# Patient Record
Sex: FEMALE | Race: WHITE | URBAN - METROPOLITAN AREA
[De-identification: names, ages, dates, MRNs, and addresses within clinical notes are randomized per-mention and may not be internally consistent; named-entity substitution may affect disease eponyms.]

---

## 2019-06-21 ENCOUNTER — OFFICE VISIT (OUTPATIENT)
Dept: URBAN - METROPOLITAN AREA CLINIC 33 | Facility: CLINIC | Age: 69
End: 2019-06-21
Payer: MEDICARE

## 2019-06-21 PROCEDURE — 99204 OFFICE O/P NEW MOD 45 MIN: CPT | Performed by: OPHTHALMOLOGY

## 2019-06-21 ASSESSMENT — INTRAOCULAR PRESSURE
OS: 10
OD: 10

## 2019-06-21 NOTE — IMPRESSION/PLAN
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 Discussed treatment options with patient. Recommend excision of left lower eyelid lesions medial and lateral w/ biopsy  to rule out malignancy of suspicious looking lesions.

## 2019-11-22 ENCOUNTER — PROCEDURE (OUTPATIENT)
Dept: URBAN - METROPOLITAN AREA CLINIC 33 | Facility: CLINIC | Age: 69
End: 2019-11-22
Payer: MEDICARE

## 2019-11-22 DIAGNOSIS — D48.1 NEOPLASM OF UNCERTAIN BEHAVIOR OF CONNECTIVE AND OTHER SOFT TISSUE: Primary | ICD-10-CM

## 2019-11-22 PROCEDURE — 67840 REMOVE EYELID LESION: CPT | Performed by: OPHTHALMOLOGY

## 2019-11-22 PROCEDURE — 99214 OFFICE O/P EST MOD 30 MIN: CPT | Performed by: OPHTHALMOLOGY

## 2019-11-22 RX ORDER — ERYTHROMYCIN 5 MG/G
OINTMENT OPHTHALMIC
Qty: 1 | Refills: 0 | Status: INACTIVE
Start: 2019-11-22 | End: 2020-12-17

## 2019-11-22 NOTE — IMPRESSION/PLAN
Impression: Neoplasm of uncertain behavior of connective and other soft tissue: D48.1. Plan: Discussed diagnosis in detail with patient. Discussed treatment options with patient. Recommend biopsy, obtain prior authorization, further conduct after biopsy results. RBA's discussed in detail with patient today, patient understands and agrees and wishes to proceed with procedure. Schedule excision of lesions in clinic today.

## 2020-12-17 ENCOUNTER — OFFICE VISIT (OUTPATIENT)
Dept: URBAN - METROPOLITAN AREA CLINIC 33 | Facility: CLINIC | Age: 70
End: 2020-12-17
Payer: MEDICARE

## 2020-12-17 PROCEDURE — 92004 COMPRE OPH EXAM NEW PT 1/>: CPT | Performed by: OPTOMETRIST

## 2020-12-17 ASSESSMENT — INTRAOCULAR PRESSURE
OS: 17
OD: 17

## 2020-12-17 ASSESSMENT — KERATOMETRY
OD: 43.13
OS: 43.38

## 2020-12-17 NOTE — IMPRESSION/PLAN
Impression: Age-related nuclear cataract, bilateral: H25.13. - mild Plan: No treatment is required at this time.

## 2021-10-19 ENCOUNTER — OFFICE VISIT (OUTPATIENT)
Dept: URBAN - METROPOLITAN AREA CLINIC 51 | Facility: CLINIC | Age: 71
End: 2021-10-19
Payer: OTHER MISCELLANEOUS

## 2021-10-19 DIAGNOSIS — H25.13 AGE-RELATED NUCLEAR CATARACT, BILATERAL: ICD-10-CM

## 2021-10-19 DIAGNOSIS — G43.109 MIGRAINE WITH AURA: Primary | ICD-10-CM

## 2021-10-19 DIAGNOSIS — H52.4 PRESBYOPIA: ICD-10-CM

## 2021-10-19 DIAGNOSIS — H43.313 VITREOUS MEMBRANES AND STRANDS, BILATERAL: ICD-10-CM

## 2021-10-19 PROCEDURE — 92134 CPTRZ OPH DX IMG PST SGM RTA: CPT | Performed by: OPTOMETRIST

## 2021-10-19 PROCEDURE — 99204 OFFICE O/P NEW MOD 45 MIN: CPT | Performed by: OPTOMETRIST

## 2021-10-19 ASSESSMENT — VISUAL ACUITY
OS: 20/20
OD: 20/20

## 2021-10-19 ASSESSMENT — INTRAOCULAR PRESSURE
OD: 13
OS: 13

## 2021-10-19 ASSESSMENT — KERATOMETRY
OD: 43.16
OS: 43.33

## 2021-10-19 NOTE — IMPRESSION/PLAN
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 *Denies history of headaches or migraines *Denies pain, numbness or tingling sensation Plan: Patient noticing intermittent visual auras without migraine/headache OS>OD. Patient states visual auras last for a few minutes. Visual auras have been monocular and not binocular. No ocular etiology noted on today's examination. Due to monocular nature, recommend follow up with PCP for CT or MRI along with carotid doppler. 
RTC 2 months

## 2022-02-03 ENCOUNTER — OFFICE VISIT (OUTPATIENT)
Dept: URBAN - METROPOLITAN AREA CLINIC 51 | Facility: CLINIC | Age: 72
End: 2022-02-03
Payer: OTHER MISCELLANEOUS

## 2022-02-03 PROCEDURE — 99213 OFFICE O/P EST LOW 20 MIN: CPT | Performed by: OPTOMETRIST

## 2022-02-03 NOTE — IMPRESSION/PLAN
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 *Denies history of headaches or migraines *Denies pain, numbness or tingling sensation Plan: Patient noticing intermittent visual auras without migraine/headache OS>OD. Patient states visual auras last for a few minutes. Visual auras have been monocular and not binocular. No ocular etiology noted on today's examination. Patient had recommended testing done with no abnormal findings on carotid and MRI showed ischemic changes (does not have results with her). If symptoms worsen/ occur more frequently patient will contact our office. If ever notices black outs in vision, patient to go directly the Emergency Room.

## 2022-12-08 ENCOUNTER — OFFICE VISIT (OUTPATIENT)
Dept: URBAN - METROPOLITAN AREA CLINIC 51 | Facility: CLINIC | Age: 72
End: 2022-12-08
Payer: OTHER MISCELLANEOUS

## 2022-12-08 DIAGNOSIS — H40.023 OPEN ANGLE WITH BORDERLINE FINDINGS, HIGH RISK, BILATERAL: Primary | ICD-10-CM

## 2022-12-08 DIAGNOSIS — H04.123 TEAR FILM INSUFFICIENCY OF BILATERAL LACRIMAL GLANDS: ICD-10-CM

## 2022-12-08 DIAGNOSIS — H43.313 VITREOUS MEMBRANES AND STRANDS, BILATERAL: ICD-10-CM

## 2022-12-08 DIAGNOSIS — G43.109 MIGRAINE WITH AURA: ICD-10-CM

## 2022-12-08 DIAGNOSIS — H25.13 AGE-RELATED NUCLEAR CATARACT, BILATERAL: ICD-10-CM

## 2022-12-08 PROCEDURE — 92014 COMPRE OPH EXAM EST PT 1/>: CPT | Performed by: OPTOMETRIST

## 2022-12-08 PROCEDURE — 92133 CPTRZD OPH DX IMG PST SGM ON: CPT | Performed by: OPTOMETRIST

## 2022-12-08 PROCEDURE — 92134 CPTRZ OPH DX IMG PST SGM RTA: CPT | Performed by: OPTOMETRIST

## 2022-12-08 ASSESSMENT — VISUAL ACUITY
OD: 20/20
OS: 20/20

## 2022-12-08 ASSESSMENT — KERATOMETRY
OS: 43.33
OD: 43.19

## 2022-12-08 ASSESSMENT — INTRAOCULAR PRESSURE
OS: 13
OD: 13

## 2022-12-08 NOTE — IMPRESSION/PLAN
Impression: Age-related nuclear cataract, bilateral: H25.13. Plan: Cataracts are mild and not visually significant. No treatment is currently warranted due to current level of vision. Patient will monitor vision closely and contact our office with any changes/ worsening in vision. Monitor annually.

## 2022-12-08 NOTE — IMPRESSION/PLAN
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 *Denies history of headaches or migraines *Denies pain, numbness or tingling sensation *Previous MRI showed ischemic changes Plan: Longstanding history of intermittent visual auras without migraine/headache OS>OD. Patient states visual auras last for a few minutes. Visual auras have been monocular and not binocular. No ocular etiology noted on today's examination.

## 2022-12-08 NOTE — IMPRESSION/PLAN
Impression: Open angle with borderline findings, high risk, bilateral: H40.023. Plan: Drance heme OS 
IOPs today: 13/13 RNFL OCT (12/08/2022) OD: WNL  ;  OS: bdl inf>sup GCA OCT (12/08/2022) OD: WNL  ;  OS: bdl IT Reviewed today's findings with patient. High risk suspect OD>OS 2' presence of drance hemorrhage OS. Baseline RNFL/GCA OCT obtained today. Discussed the need to bring back for baseline HVF. Glaucoma is usually diagnosed when the following three criteria are present: elevated intraocular pressure, optic nerve damage, and visual field loss. When some of these criteria are missing, it can be challenging to make the diagnosis of glaucoma. Glaucoma suspects have some characteristics of glaucoma, but not all, and the findings may be very subtle.  

RTC within 1-1.5 months for 24-2c HVF + IOP check

## 2022-12-08 NOTE — IMPRESSION/PLAN
Impression: Tear film insufficiency of bilateral lacrimal glands: H04.123. Plan: Continue Retaine AT's as needed for comfort.

## 2023-02-03 ENCOUNTER — OFFICE VISIT (OUTPATIENT)
Dept: URBAN - METROPOLITAN AREA CLINIC 51 | Facility: CLINIC | Age: 73
End: 2023-02-03
Payer: OTHER MISCELLANEOUS

## 2023-02-03 DIAGNOSIS — H40.023 OPEN ANGLE WITH BORDERLINE FINDINGS, HIGH RISK, BILATERAL: Primary | ICD-10-CM

## 2023-02-03 PROCEDURE — 99213 OFFICE O/P EST LOW 20 MIN: CPT | Performed by: OPTOMETRIST

## 2023-02-03 PROCEDURE — 92083 EXTENDED VISUAL FIELD XM: CPT | Performed by: OPTOMETRIST

## 2023-02-03 ASSESSMENT — INTRAOCULAR PRESSURE
OS: 12
OD: 13

## 2023-02-03 NOTE — IMPRESSION/PLAN
Impression: Open angle with borderline findings, high risk, bilateral: H40.023. HVF 24-2c (02/03/2023) OD: sup points x2 - don't correlate with RNFL ; OS: full RNFL OCT (12/08/2022) OD: WNL  ;  OS: bdl inf>sup GCA OCT (12/08/2022) OD: WNL  ;  OS: bdl IT Plan: Drance heme OS - resolved IOPs today: 13/12 Reviewed today's findings with patient. Resolved drance hemorrhage since last visit. Baseline HVF obtained today. Does not appear that patient has glaucoma at this time. No treatment is currently necessary.  

RTC in 6 months for IOP check

## 2023-09-19 ENCOUNTER — OFFICE VISIT (OUTPATIENT)
Dept: URBAN - METROPOLITAN AREA CLINIC 51 | Facility: CLINIC | Age: 73
End: 2023-09-19
Payer: OTHER MISCELLANEOUS

## 2023-09-19 DIAGNOSIS — H40.023 OPEN ANGLE WITH BORDERLINE FINDINGS, HIGH RISK, BILATERAL: Primary | ICD-10-CM

## 2023-09-19 PROCEDURE — 99213 OFFICE O/P EST LOW 20 MIN: CPT | Performed by: OPTOMETRIST

## 2023-09-19 ASSESSMENT — INTRAOCULAR PRESSURE
OS: 10
OD: 11

## 2025-02-04 ENCOUNTER — OFFICE VISIT (OUTPATIENT)
Dept: URBAN - METROPOLITAN AREA CLINIC 51 | Facility: CLINIC | Age: 75
End: 2025-02-04
Payer: OTHER MISCELLANEOUS

## 2025-02-04 DIAGNOSIS — H40.023 OPEN ANGLE WITH BORDERLINE FINDINGS, HIGH RISK, BILATERAL: ICD-10-CM

## 2025-02-04 DIAGNOSIS — H04.123 TEAR FILM INSUFFICIENCY OF BILATERAL LACRIMAL GLANDS: ICD-10-CM

## 2025-02-04 DIAGNOSIS — H31.091 CHORIORETINAL SCARS, RIGHT EYE: ICD-10-CM

## 2025-02-04 DIAGNOSIS — H25.813 COMBINED FORMS OF AGE-RELATED CATARACT, BILATERAL: Primary | ICD-10-CM

## 2025-02-04 DIAGNOSIS — H43.313 VITREOUS MEMBRANES AND STRANDS, BILATERAL: ICD-10-CM

## 2025-02-04 DIAGNOSIS — G43.109 MIGRAINE WITH AURA: ICD-10-CM

## 2025-02-04 PROCEDURE — 92134 CPTRZ OPH DX IMG PST SGM RTA: CPT | Performed by: OPTOMETRIST

## 2025-02-04 PROCEDURE — 92133 CPTRZD OPH DX IMG PST SGM ON: CPT | Performed by: OPTOMETRIST

## 2025-02-04 PROCEDURE — 92014 COMPRE OPH EXAM EST PT 1/>: CPT | Performed by: OPTOMETRIST

## 2025-02-04 ASSESSMENT — KERATOMETRY
OS: 43.63
OD: 43.63

## 2025-02-04 ASSESSMENT — INTRAOCULAR PRESSURE
OS: 16
OD: 16

## 2025-02-04 ASSESSMENT — VISUAL ACUITY: OD: 20/20
